# Patient Record
Sex: FEMALE | Race: WHITE | NOT HISPANIC OR LATINO | Employment: FULL TIME | URBAN - METROPOLITAN AREA
[De-identification: names, ages, dates, MRNs, and addresses within clinical notes are randomized per-mention and may not be internally consistent; named-entity substitution may affect disease eponyms.]

---

## 2018-06-01 ENCOUNTER — SKIN CANCER EXAM (OUTPATIENT)
Dept: URBAN - METROPOLITAN AREA CLINIC 36 | Facility: CLINIC | Age: 28
Setting detail: DERMATOLOGY
End: 2018-06-01

## 2018-06-01 PROCEDURE — 99214 OFFICE O/P EST MOD 30 MIN: CPT

## 2019-06-07 ENCOUNTER — SKIN CANCER EXAM (OUTPATIENT)
Dept: URBAN - METROPOLITAN AREA CLINIC 36 | Facility: CLINIC | Age: 29
Setting detail: DERMATOLOGY
End: 2019-06-07

## 2019-06-07 DIAGNOSIS — L57.0 ACTINIC KERATOSIS: ICD-10-CM

## 2019-06-07 PROCEDURE — 99214 OFFICE O/P EST MOD 30 MIN: CPT

## 2020-06-09 ENCOUNTER — SKIN CANCER EXAM (OUTPATIENT)
Dept: URBAN - METROPOLITAN AREA CLINIC 36 | Facility: CLINIC | Age: 30
Setting detail: DERMATOLOGY
End: 2020-06-09

## 2020-06-09 PROBLEM — D18.01 HEMANGIOMA OF SKIN AND SUBCUTANEOUS TISSUE: Status: RESOLVED | Noted: 2020-06-09

## 2020-06-09 PROBLEM — L82.1 OTHER SEBORRHEIC KERATOSIS: Status: RESOLVED | Noted: 2020-06-09

## 2020-06-09 PROCEDURE — 99214 OFFICE O/P EST MOD 30 MIN: CPT

## 2024-07-18 ENCOUNTER — DASHBOARD ENCOUNTERS (OUTPATIENT)
Age: 34
End: 2024-07-18

## 2024-07-18 ENCOUNTER — OFFICE VISIT (OUTPATIENT)
Dept: URBAN - METROPOLITAN AREA CLINIC 92 | Facility: CLINIC | Age: 34
End: 2024-07-18
Payer: COMMERCIAL

## 2024-07-18 ENCOUNTER — LAB OUTSIDE AN ENCOUNTER (OUTPATIENT)
Dept: URBAN - METROPOLITAN AREA CLINIC 92 | Facility: CLINIC | Age: 34
End: 2024-07-18

## 2024-07-18 VITALS
WEIGHT: 139 LBS | DIASTOLIC BLOOD PRESSURE: 75 MMHG | HEIGHT: 64 IN | SYSTOLIC BLOOD PRESSURE: 120 MMHG | TEMPERATURE: 97.2 F | BODY MASS INDEX: 23.73 KG/M2 | HEART RATE: 65 BPM

## 2024-07-18 DIAGNOSIS — Z80.0 FAMILY HISTORY OF COLON CANCER: ICD-10-CM

## 2024-07-18 DIAGNOSIS — R19.7 DIARRHEA, UNSPECIFIED TYPE: ICD-10-CM

## 2024-07-18 DIAGNOSIS — R19.4 CHANGE IN BOWEL HABITS: ICD-10-CM

## 2024-07-18 PROCEDURE — 99204 OFFICE O/P NEW MOD 45 MIN: CPT | Performed by: INTERNAL MEDICINE

## 2024-07-18 RX ORDER — AZELASTINE HYDROCHLORIDE 137 UG/1
1 PUFF IN EACH NOSTRIL SPRAY, METERED NASAL TWICE A DAY
Status: DISCONTINUED | COMMUNITY

## 2024-07-18 RX ORDER — CLOTRIMAZOLE AND BETAMETHASONE DIPROPIONATE 10; .5 MG/G; MG/G
1 APPLICATION CREAM TOPICAL TWICE A DAY
Status: DISCONTINUED | COMMUNITY

## 2024-07-18 RX ORDER — SERTRALINE 25 MG/1
1 TABLET TABLET, FILM COATED ORAL ONCE A DAY
Status: DISCONTINUED | COMMUNITY

## 2024-07-18 NOTE — HPI-TODAY'S VISIT:
This is a 35 yo F referred by Dr. Giovanny Hawthorne for a colonoscopy.  A copy of this document will be sent to the referring provider. The patient denies abdominal pain, weight loss, rectal bleeding, constipation, diarrhea, and a change in bowel habits. Her father and paternal granfather had colon cancer. Today the paient complains of abdominal pain, bloating and loose stools for the past year.  She denies rectal bleeding and weight loss.  She denies heartburn and acid reflux.  Bloating occurs 1 - 2 times per month.

## 2024-07-18 NOTE — PHYSICAL EXAM RECTAL:
normal tone, no external hemorrhoids, no masses palpable, no red blood, Tenderness on MARS, Internal hemorrhoids present

## 2024-07-24 ENCOUNTER — TELEPHONE ENCOUNTER (OUTPATIENT)
Dept: URBAN - METROPOLITAN AREA CLINIC 92 | Facility: CLINIC | Age: 34
End: 2024-07-24

## 2024-07-30 ENCOUNTER — OFFICE VISIT (OUTPATIENT)
Dept: URBAN - METROPOLITAN AREA MEDICAL CENTER 33 | Facility: MEDICAL CENTER | Age: 34
End: 2024-07-30

## 2024-08-05 ENCOUNTER — OFFICE VISIT (OUTPATIENT)
Dept: URBAN - METROPOLITAN AREA SURGERY CENTER 16 | Facility: SURGERY CENTER | Age: 34
End: 2024-08-05